# Patient Record
(demographics unavailable — no encounter records)

---

## 2021-04-09 DIAGNOSIS — Z23 NEED FOR VACCINATION: ICD-10-CM

## 2025-06-02 NOTE — H&P
Orthopaedic Surgery  [address] [place] [address]  [phone]     NEW PATIENT VISIT - HISTORY AND PHYSICAL EXAMINATION     Name: Amari Caicedo   MRN: [id number]  Date: 6/2/2025     CC: Right shoulder pain    HPI:   Amari Caicedo is a very pleasant 41 year old right-hand dominant male who presents today for evaluation of right shoulder pain, symptomatic for the last 8 months. He reports pain up to 3/10 that improves with exercise. He reports additional stress on the shoulder causes increased stiffness. He has participated in 8 sessions of physical therapy with [facility] with no improvement. He has also tried meloxicam 15 mg daily with no improvement. He has also started to notice mild numbness and tingling in his fingers.    He lives at home with his wife and kids.      PMH:   Past Medical History[1]    PAST SURGICAL HX:  Past Surgical History[2]    FAMILY HX:  Family History[3]    ALLERGIES:  Patient has no allergy information on record.    MEDICATIONS: Current Medications[4]    ROS: A comprehensive 14 point review of systems was performed and was negative aside from the aforementioned per history of present illness.    SOCIAL HX:  Social History     Tobacco Use    Smoking status: Never    Smokeless tobacco: Never   Substance Use Topics    Alcohol use: Not on file       PE:   Vitals:    06/02/25 1258   Weight: 194 lb   Height: 5' 10.8\" (1.798 m)     Estimated body mass index is 27.21 kg/m² as calculated from the following:    Height as of this encounter: 5' 10.8\" (1.798 m).    Weight as of this encounter: 194 lb.    Physical Exam  Constitutional:       Appearance: Normal appearance.   HENT:      Head: Normocephalic and atraumatic.   Eyes:      Extraocular Movements: Extraocular movements intact.   Neck:      Musculoskeletal: Normal range of motion and neck supple.   Cardiovascular:      Pulses: Normal pulses.   Pulmonary:      Effort: Pulmonary effort is normal. No respiratory distress.    Abdominal:      General: There is no distension.   Skin:     General: Skin is warm.      Capillary Refill: Capillary refill takes less than 2 seconds.      Findings: No bruising.   Neurological:      General: No focal deficit present.      Mental Status: Alert.   Psychiatric:         Mood and Affect: Mood normal.     Examination of the right shoulder demonstrates:   Skin is intact, warm and dry.   Cervical:  Full ROM  Spurling's  Negative    Deformity:   none  Atrophy:   none    Scapular winging: Negative    Palpation:     AC Joint   Negative  Biceps Tendon  Negative  Greater Tuberosity Negative    ROM:   Forward Flexion:  150°  Abduction:   full and symmetric  External Rotation:  full and symmetric  Internal Rotation:  full and symmetric    Rotator Cuff Strength:   Supraspinatus:   5/5  Subscapularis:   5/5  Infraspinatus/Teres: 5/5    Provocative Tests:   Souza:   Negative  Speed's:   Positive  Buchanan's:   Positive  Lift-off:    Negative  Apprehension:  Negative  Sulcus Sign:   Negative    Neurovascular Upper Extremity (Bilateral)  Motor:    5/5 EPL, Finger Abduction, , Pinch, Deltoid  Sensation:   intact to light touch median, ulnar, radial and axillary nerve  Circulation:   Normal, 2+ radial pulse    The contralateral upper extremity is without limitation in range of motion or strength, no positive provocative maneuvers.       Radiographic Examination/Diagnostics:    Shoulder XR personally viewed, independently interpreted and radiology report was reviewed.    XR SHOULDER, COMPLETE (MIN 2 VIEWS), RIGHT (CPT=73030)  Result Date: 6/2/2025  PROCEDURE:  XR SHOULDER, COMPLETE (MIN 2 VIEWS), RIGHT (CPT=73030)  TECHNIQUE:  Multiple views were obtained.  COMPARISON:  None.  INDICATIONS:  M25.511 Right shoulder pain, unspecified chronicity  PATIENT STATED HISTORY: (As transcribed by Technologist)  Ortho evaluation. Patient states he has right shoulder pain during certain movements for 7-8 months. Denies any  injury.    FINDINGS:  No acute fracture or dislocation is seen.  There is normal alignment.  Normal bone mineral density.  If clinical symptoms persist then recommend follow-up imaging.            CONCLUSION:  See above.   LOCATION:  [id number]   Dictated by (CST): Monroe Holcomb MD on 6/02/2025 at 1:20 PM     Finalized by (CST): Monroe Holcomb MD on 6/02/2025 at 1:23 PM         Radiographs demonstrate no evidence of osteoarthritis with well-maintained joint space.  No fractures or dislocations.       IMPRESSION: Amari Caicedo is a 41 year old Right hand dominant male presents with right shoulder rotator cuff injury symptomatic for 8 months.    PLAN:   We had a detailed discussion outlining the etiology, anatomy, pathophysiology, and natural history of rotator cuff injury. Imaging was reviewed in detail and correlated to a 3-dimensional model of the shoulder.     In light of the chronicity of symptoms, loss of normal function, and  failure to progress conservatively we recommend an MRI to evaluate the integrity of the patient's right shoulder. The patient will follow up after imaging.   Differential diagnosis includes but not limited to: rotator cuff/labral pathology, impingement, tendinopathy, cartilage injury/loose body, bone marrow edema, and osteoarthritis.     External records were also reviewed for pertinent historical findings contributing to the patients undiagnosed new problem with uncertain prognosis.     The patient had the opportunity to ask questions, and all questions were answered appropriately.      FOLLOW-UP:   Return to clinic after MRI completion.       Solo Barajas MD  Knee, Shoulder, & Elbow Surgery / Sports Medicine Specialist  Orthopaedic Surgery  [address] [place] [address] [id number] [place] [email or web address]  [email or web address]  t: [phone]  o: [phone]  f: [phone]    This note was dictated using Dragon software.  While it was briefly proofread prior to completion, some  grammatical, spelling, and word choice errors due to dictation may still occur.  The patient verbally consented to be recorded using ambient AI listening technology and understand that they can each withdraw their consent to this listening technology at any point by asking the clinician to turn off or pause the recording:  I, Penny Abreu, attest that this documentation has been prepared under the direction and in the presence of Dr. Solo Barajas MD.            [1] History reviewed. No pertinent past medical history.  [2] History reviewed. No pertinent surgical history.  [3] History reviewed. No pertinent family history.  [4]   No current outpatient medications on file.

## 2025-06-30 NOTE — PROGRESS NOTES
[facility] - ORTHOPEDICS  [address] [place] [address]  [phone]       Name: Amari Caicedo   MRN: [id number]  Date: 6/30/2025     REASON FOR VISIT: Shoulder MRI review and discussion regarding next steps in management.     INTERVAL HISTORY:  Amari Caicedo is a 41 year old right-hand dominant male who presents today for MRI review of the shoulder.     To summarize: Right shoulder pain, symptomatic for the last 8 months. He reports pain up to 5/10 that improves with exercise. He reports additional stress on the shoulder causes increased stiffness. He has participated in 8 sessions of physical therapy with [facility] with no improvement. He has also tried meloxicam 15 mg daily with no improvement. He has also started to notice mild numbness and tingling in his fingers.       PE:   There were no vitals filed for this visit.  Estimated body mass index is 27.21 kg/m² as calculated from the following:    Height as of 6/2/25: 5' 10.8\" (1.798 m).    Weight as of 6/2/25: 194 lb.    Physical Exam  Constitutional:       Appearance: Normal appearance.   HENT:      Head: Normocephalic and atraumatic.   Eyes:      Extraocular Movements: Extraocular movements intact.   Neck:      Musculoskeletal: Normal range of motion and neck supple.   Cardiovascular:      Pulses: Normal pulses.   Pulmonary:      Effort: Pulmonary effort is normal. No respiratory distress.   Abdominal:      General: There is no distension.   Skin:     General: Skin is warm.      Capillary Refill: Capillary refill takes less than 2 seconds.      Findings: No bruising.   Neurological:      General: No focal deficit present.      Mental Status: Alert.   Psychiatric:         Mood and Affect: Mood normal.     Examination of the right shoulder demonstrates:     Skin is intact, warm and dry.   Cervical:  Full ROM  Spurling's  Negative    Deformity:   none  Atrophy:   none    Scapular winging: Negative    Palpation:     AC  Joint   Negative  Biceps Tendon  Negative  Greater Tuberosity Negative    ROM:   Forward Flexion:  full and symmetric  Abduction:   full and symmetric  External Rotation:  full and symmetric  Internal Rotation:  full and symmetric    Rotator Cuff Strength:   Supraspinatus:   5/5  Subscapularis:   5/5  Infraspinatus/Teres: 5/5    Provocative Tests:   Souza:   Positive  Speed's:   Negative  Cocke's:   Equivocal  Lift-off:    Negative  Apprehension:  Negative  Sulcus Sign:   Negative    Neurovascular Upper Extremity (Bilateral)  Motor:    5/5 EPL, Finger Abduction, , Pinch, Deltoid  Sensation:   intact to light touch median, ulnar, radial and axillary nerve  Circulation:   Normal, 2+ radial pulse    The contralateral upper extremity is without limitation in range of motion or strength, no positive provocative maneuvers.     Radiographic Examination/Diagnostics:    MRI and XR of the shoulder personally viewed, independently interpreted and radiology report was reviewed.    MRI SHOULDER, RIGHT (CPT=73221)  Result Date: 6/19/2025  Exam: MRI SHOULDER RT W/O CONTRAST CPT Code(s): 11146 - MRI JOINT UPR EXTREM W/O DYE CLINICAL HISTORY: Right shoulder pain, weakness and limited range of motion.   TECHNIQUE: Non-infused, multiplanar and multi-sequential ultrahigh field 3.0 Deisy MRI of the right shoulder was performed.   COMPARISON: Outside x-ray report dated 6/2/2025 is reviewed.   FINDINGS: There is mild thickening and increased signal in the supraspinatus tendon with minimal bursal surface fraying. There is a tiny focus of fluid signal intensity contacting the bursal surface far distally and anteriorly. No full-thickness rotator cuff tear,  tendon retraction or muscle atrophy is seen. The biceps tendon appears intact. No discrete labral tear is identified. There is no joint effusion. The glenohumeral and acromioclavicular joint spaces appear preserved. A type I anterior acromion process is present and is slightly  downward sloping laterally. No fracture or focal bone lesion is identified. The musculature demonstrates normal signal characteristics.     IMPRESSION:   1. Tiny shallow bursal surface tear in the far distal supraspinatus tendon anteriorly without tendon retraction or muscle atrophy. There is mild additional supraspinatus tendinosis and minimal bursal surface fraying.   2. Otherwise, unremarkable MRI of the right shoulder.   This report was performed utilizing speech recognition software technology. Despite thorough proofreading, speech recognition errors could escape detection. If a word or phrase is confusing or out of context, please do not hesitate to call for clarification.   Interpreting Radiologist: Rei Guevara M.D.   Electronically Signed: 06/19/2025 02:06 PM    XR SHOULDER, COMPLETE (MIN 2 VIEWS), RIGHT (CPT=73030)  Result Date: 6/2/2025  PROCEDURE:  XR SHOULDER, COMPLETE (MIN 2 VIEWS), RIGHT (CPT=73030)    TECHNIQUE:  Multiple views were obtained.    COMPARISON:  None.    INDICATIONS:  M25.511 Right shoulder pain, unspecified chronicity    PATIENT STATED HISTORY: (As transcribed by Technologist)  Ortho evaluation. Patient states he has right shoulder pain during certain movements for 7-8 months. Denies any injury.      FINDINGS:  No acute fracture or dislocation is seen.  There is normal alignment.  Normal bone mineral density.  If clinical symptoms persist then recommend follow-up imaging.            CONCLUSION:  See above.     LOCATION:  [id number]     Dictated by (CST): Monroe Holcomb MD on 6/02/2025 at 1:20 PM       Finalized by (CST): Monroe Holcomb MD on 6/02/2025 at 1:23 PM         IMPRESSION: Amari Caicedo is a 41 year old with right shoulder subacromial impingement in the setting of type II acromion.  I discussed the role of maximizing conservative measures with subacromial space corticosteroid injection coupled with future consideration of arthroscopic subacromial decompression.    PLAN:   We reviewed  the treatment of this disease condition.  Fortunately, treatment is amenable to conservative treatment which we chose to optimize at today's visit.  I recommended intra-articular glenohumeral injection with corticosteroid and ketorolac coupled with physical therapy to aid in strengthening, range of motion, functional improvement, and return to baseline activity.  This was administered and well tolerated.     The patient had the opportunity to ask questions and all questions were answered appropriately.     I spent 30 minutes in preparation to see the patient, counseling/education of relevant pathology, discussing MRI imaging results, ordering medication intervention, administering shoulder injection, care coordination, and documentation into the medical record.        FOLLOW-UP:   Proceed with physical therapy for 6-10 weeks. If symptoms do not resolve, plan for follow-up to discussion additional management plan.             Solo Barajas MD  Knee, Shoulder, & Elbow Surgery / Sports Medicine Specialist  Orthopaedic Surgery  [address] [place] [address].[email or web address]  t: [phone]  o: [phone]  f: [phone]      This note was dictated using Dragon software.  While it was briefly proofread prior to completion, some grammatical, spelling, and word choice errors due to dictation may still occur.  The patient verbally consented to be recorded using ambient AI listening technology and understand that they can each withdraw their consent to this listening technology at any point by asking the clinician to turn off or pause the recording: